# Patient Record
Sex: FEMALE | Race: WHITE
[De-identification: names, ages, dates, MRNs, and addresses within clinical notes are randomized per-mention and may not be internally consistent; named-entity substitution may affect disease eponyms.]

---

## 2017-03-29 ENCOUNTER — HOSPITAL ENCOUNTER (OUTPATIENT)
Dept: HOSPITAL 45 - C.PAPS | Age: 52
Discharge: HOME | End: 2017-03-29
Attending: OBSTETRICS & GYNECOLOGY
Payer: COMMERCIAL

## 2017-03-29 DIAGNOSIS — Z12.4: Primary | ICD-10-CM

## 2017-11-16 ENCOUNTER — HOSPITAL ENCOUNTER (OUTPATIENT)
Dept: HOSPITAL 45 - C.LABBC | Age: 52
Discharge: HOME | End: 2017-11-16
Attending: INTERNAL MEDICINE
Payer: COMMERCIAL

## 2017-11-16 DIAGNOSIS — E04.1: Primary | ICD-10-CM

## 2017-11-16 DIAGNOSIS — Z00.00: ICD-10-CM

## 2017-11-16 LAB
CHOLEST/HDLC SERPL: 2.8 {RATIO}
GLUCOSE UR QL: 56 MG/DL
KETONES UR QL STRIP: 82 MG/DL
NITRITE UR QL STRIP: 102 MG/DL (ref 0–150)
PH UR: 158 MG/DL (ref 0–200)
TSH SERPL-ACNC: 0.86 UIU/ML (ref 0.3–4.5)
VERY LOW DENSITY LIPOPROT CALC: 20 MG/DL

## 2017-11-17 ENCOUNTER — HOSPITAL ENCOUNTER (OUTPATIENT)
Dept: HOSPITAL 45 - C.ULTRBC | Age: 52
Discharge: HOME | End: 2017-11-17
Attending: INTERNAL MEDICINE
Payer: COMMERCIAL

## 2017-11-17 DIAGNOSIS — E04.1: Primary | ICD-10-CM

## 2017-11-17 NOTE — DIAGNOSTIC IMAGING REPORT
SOFT TISS HEAD/NECK-THYROID



CLINICAL HISTORY: 52 years-old Female with THYROID CYST.  Follow-up study to

assess a thyroid cyst. 3.5 cm cyst of the lower left thyroid noted on comparison

study



COMPARISON: Thyroid ultrasound 4/8/2016



TECHNIQUE: Multiple real time sonographic images of the thyroid were obtained

accessing gray scale appearance and color doppler flow.



FINDINGS: 



MEASUREMENTS: 

Right lobe: 5.5 x 1.7 x 1.8 cm

Left lobe: 6.2 x 2.8 x 2.4 cm

Isthmus: 0.2 cm



PARENCHYMA: The thyroid parenchymal echotexture is mildly heterogeneous.



NODULES: No discrete nodule identified throughout the right thyroid. There are

multiple ill-defined areas of decreased echogenicity measuring up to 2 mm

suggesting colloid cysts within the right lobe of the thyroid. Hypoechoic nodule

of the upper pole left thyroid measures 0.5 cm suggesting colloid cyst with

areas of echogenic nonshadowing internal foci suggesting colloid. Large cyst of

the mid pole left thyroid is again seen which measures up to 3.9 x 2.5 x 2.0 cm,

previously measuring 3.5 x 2.4 cm. No suspicious features such as mural

nodularity or associated soft tissue component.



IMPRESSION: 

1. Mildly heterogeneous appearance of the thyroid without suspicious nodule

identified.

2. 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 Large cyst of the midpole left

thyroid measuring up to 3.9 cm is stable to slightly increase in size from

comparison study 4/8/2016. Again, this likely reflects a colloid cyst as well

without suspicious features identified.



The above report was generated using voice recognition software. It may contain

grammatical, syntax or spelling errors.







Electronically signed by:  Madhu Shannon M.D.

11/17/2017 10:19 AM



Dictated Date/Time:  11/17/2017 10:13 AM

## 2018-02-15 ENCOUNTER — HOSPITAL ENCOUNTER (OUTPATIENT)
Dept: HOSPITAL 45 - C.MAMM | Age: 53
Discharge: HOME | End: 2018-02-15
Attending: OBSTETRICS & GYNECOLOGY
Payer: COMMERCIAL

## 2018-02-15 DIAGNOSIS — Z12.31: Primary | ICD-10-CM

## 2018-02-15 NOTE — MAMMOGRAPHY REPORT
BILATERAL DIGITAL SCREENING MAMMOGRAM TOMOSYNTHESIS WITH CAD: 2/15/2018

CLINICAL HISTORY: Routine screening.  Patient has no complaints.  





TECHNIQUE:  Breast tomosynthesis in addition to standard 2D mammography was performed. Current study 
was also evaluated with a Computer Aided Detection (CAD) system.  



COMPARISON: Comparison is made to exam dated:  9/27/2007.   



BREAST COMPOSITION:  There are scattered areas of fibroglandular density in both breasts.  



FINDINGS:  No suspicious masses, calcifications, or areas of architectural distortion are noted in ei
ther breast. There has been no significant interval change compared to prior exams.  



IMPRESSION:  ACR BI-RADS CATEGORY 1: NEGATIVE

There is no mammographic evidence of malignancy. A 1 year screening mammogram is recommended.  The pa
tient will receive written notification of the results.  





Approximately 10% of breast cancers are not detected with mammography. A negative mammographic report
 should not delay biopsy if a clinically suggestive mass is present.



Gwendolyn Fraire M.D.          

ah/:2/15/2018 14:30:13  



Imaging Technologist: Effie CABRERAR, M, [facility]

letter sent: Normal 1/2  

BI-RADS Code: ACR BI-RADS Category 1: Negative

## 2018-05-03 ENCOUNTER — HOSPITAL ENCOUNTER (OUTPATIENT)
Dept: HOSPITAL 45 - C.PAPS | Age: 53
Discharge: HOME | End: 2018-05-03
Attending: OBSTETRICS & GYNECOLOGY
Payer: COMMERCIAL

## 2018-05-03 DIAGNOSIS — Z12.4: Primary | ICD-10-CM
